# Patient Record
Sex: FEMALE | Race: BLACK OR AFRICAN AMERICAN | NOT HISPANIC OR LATINO | Employment: STUDENT | URBAN - METROPOLITAN AREA
[De-identification: names, ages, dates, MRNs, and addresses within clinical notes are randomized per-mention and may not be internally consistent; named-entity substitution may affect disease eponyms.]

---

## 2023-08-16 ENCOUNTER — HOSPITAL ENCOUNTER (OUTPATIENT)
Dept: DATA CONVERSION | Facility: HOSPITAL | Age: 23
Discharge: HOME | End: 2023-08-16
Payer: MEDICAID

## 2023-08-16 DIAGNOSIS — R30.0 DYSURIA: ICD-10-CM

## 2023-08-16 DIAGNOSIS — N76.0 ACUTE VAGINITIS: ICD-10-CM

## 2023-08-16 LAB
BACTERIA SPEC CULT: NORMAL
C TRACH RRNA SPEC QL NAA+PROBE: ABNORMAL
CC # UR: NORMAL /UL
DRUG SCREEN COMMENT UR-IMP: ABNORMAL
N GONORRHOEA RRNA SPEC QL NAA+PROBE: ABNORMAL
REPORT STATUS -LH SQ DATA CONVERSION: NORMAL
SERVICE CMNT-IMP: NORMAL
SPECIMEN SOURCE: ABNORMAL
SPECIMEN SOURCE: NORMAL

## 2023-08-17 LAB
C GLABRATA DNA VAG QL NAA+PROBE: NEGATIVE
C KRUSEI DNA VAG QL NAA+PROBE: NEGATIVE
CANDIDA DNA VAG QL PROBE+SIG AMP: NEGATIVE
T VAGINALIS DNA VAG QL PROBE+SIG AMP: NEGATIVE
VAGINOSIS/ITIS DNA PNL VAG PROBE+SIG AMP: POSITIVE

## 2023-08-28 ENCOUNTER — HOSPITAL ENCOUNTER (OUTPATIENT)
Dept: DATA CONVERSION | Facility: HOSPITAL | Age: 23
Discharge: HOME | End: 2023-08-28
Payer: MEDICAID

## 2023-08-28 DIAGNOSIS — R30.0 DYSURIA: ICD-10-CM

## 2023-08-28 DIAGNOSIS — N60.02 SOLITARY CYST OF LEFT BREAST: ICD-10-CM

## 2024-02-22 ENCOUNTER — PROCEDURE VISIT (OUTPATIENT)
Dept: OBSTETRICS AND GYNECOLOGY | Facility: CLINIC | Age: 24
End: 2024-02-22
Payer: MEDICAID

## 2024-02-22 VITALS — WEIGHT: 182.6 LBS | SYSTOLIC BLOOD PRESSURE: 120 MMHG | BODY MASS INDEX: 29.03 KG/M2 | DIASTOLIC BLOOD PRESSURE: 90 MMHG

## 2024-02-22 DIAGNOSIS — Z30.432 ENCOUNTER FOR IUD REMOVAL: Primary | ICD-10-CM

## 2024-02-22 PROCEDURE — 58301 REMOVE INTRAUTERINE DEVICE: CPT | Performed by: OBSTETRICS & GYNECOLOGY

## 2024-02-22 ASSESSMENT — PAIN SCALES - GENERAL: PAINLEVEL: 3

## 2024-02-22 ASSESSMENT — PATIENT HEALTH QUESTIONNAIRE - PHQ9
2. FEELING DOWN, DEPRESSED OR HOPELESS: NOT AT ALL
SUM OF ALL RESPONSES TO PHQ9 QUESTIONS 1 AND 2: 0
1. LITTLE INTEREST OR PLEASURE IN DOING THINGS: NOT AT ALL

## 2024-02-22 ASSESSMENT — ENCOUNTER SYMPTOMS
DEPRESSION: 0
OCCASIONAL FEELINGS OF UNSTEADINESS: 0
LOSS OF SENSATION IN FEET: 0

## 2024-02-22 NOTE — PROGRESS NOTES
Patient ID: Lainey Cartagena is a 23 y.o. female.    Procedures    IUD Removal Procedure Note    Procedure: removal of Mirena IUD  Indications for the removal include desired fertility, patient requested, and recurrent UTI and infection  .   .      No LMP recorded (lmp unknown).    Procedure Details  IUD strings visible:  Yes  Tenaculum used:  None  Removal:  IUD strings grasped and IUD removed intact with gentle traction.  The patient tolerated the procedure well.    All appropriate instructions regarding removal were reviewed.      Plans for contraception:  condoms    Other follow-up needed:  For annual exam or sooner PRN.    The patient was advised to call for any fever or for prolonged or severe pain or bleeding.    She was advised to use OTC ibuprofen as needed for mild to moderate pain.

## 2024-06-25 ENCOUNTER — APPOINTMENT (OUTPATIENT)
Dept: OBSTETRICS AND GYNECOLOGY | Facility: CLINIC | Age: 24
End: 2024-06-25
Payer: MEDICAID

## 2024-07-03 ENCOUNTER — APPOINTMENT (OUTPATIENT)
Dept: OBSTETRICS AND GYNECOLOGY | Facility: CLINIC | Age: 24
End: 2024-07-03
Payer: MEDICAID

## 2024-07-19 ENCOUNTER — APPOINTMENT (OUTPATIENT)
Dept: UROLOGY | Facility: CLINIC | Age: 24
End: 2024-07-19
Payer: MEDICAID

## 2024-07-19 DIAGNOSIS — N30.00 ACUTE RECURRENT CYSTITIS: Primary | ICD-10-CM

## 2024-07-19 PROBLEM — F41.9 ANXIETY: Status: ACTIVE | Noted: 2024-07-19

## 2024-07-19 PROBLEM — J34.2 NOSE SEPTUM DEVIATION: Status: ACTIVE | Noted: 2024-07-19

## 2024-07-19 PROBLEM — J30.9 ALLERGIC RHINITIS: Status: ACTIVE | Noted: 2024-07-19

## 2024-07-19 PROCEDURE — 99203 OFFICE O/P NEW LOW 30 MIN: CPT | Performed by: SURGERY

## 2024-07-19 PROCEDURE — 87086 URINE CULTURE/COLONY COUNT: CPT

## 2024-07-19 PROCEDURE — 81001 URINALYSIS AUTO W/SCOPE: CPT

## 2024-07-19 NOTE — PROGRESS NOTES
Subjective   Patient ID: Lainey Cartagena is a 24 y.o. female who presents for recurrent bladder infections.      HPI  She reports having several UTI in the past year.  Typical symptoms, mainly frequency and dysuria.  Her cultures have grown Group B strep, and also bacterial vaginosis.  No associated fevers.  No history of stone disease.        Review of Systems  As per HPI, otherwise 10  system review negative.       Objective   Physical Exam    Well nourished  Abdomen soft, ND, NT, no hernias  Pelvic - normal external genitalia  Normal introitus  Patent urethra, mild hyper mobility, no leakage  No prolapse        Assessment/Plan   Problem List Items Addressed This Visit    None  Visit Diagnoses         Codes    Acute recurrent cystitis    -  Primary N30.00          U/A and culture today.   I counseled her on prevention measures, advised cranberry OTC.      Follow up PRN.            Shamika Avitia MD 07/19/24 12:06 PM

## 2024-07-20 LAB
APPEARANCE UR: ABNORMAL
BACTERIA #/AREA URNS AUTO: ABNORMAL /HPF
BILIRUB UR STRIP.AUTO-MCNC: NEGATIVE MG/DL
COLOR UR: YELLOW
GLUCOSE UR STRIP.AUTO-MCNC: NORMAL MG/DL
KETONES UR STRIP.AUTO-MCNC: NEGATIVE MG/DL
LEUKOCYTE ESTERASE UR QL STRIP.AUTO: ABNORMAL
MUCOUS THREADS #/AREA URNS AUTO: ABNORMAL /LPF
NITRITE UR QL STRIP.AUTO: NEGATIVE
PH UR STRIP.AUTO: 6.5 [PH]
PROT UR STRIP.AUTO-MCNC: ABNORMAL MG/DL
RBC # UR STRIP.AUTO: ABNORMAL /UL
RBC #/AREA URNS AUTO: ABNORMAL /HPF
SP GR UR STRIP.AUTO: 1.03
SQUAMOUS #/AREA URNS AUTO: ABNORMAL /HPF
UROBILINOGEN UR STRIP.AUTO-MCNC: NORMAL MG/DL
WBC #/AREA URNS AUTO: ABNORMAL /HPF

## 2024-07-21 LAB — BACTERIA UR CULT: NORMAL
